# Patient Record
(demographics unavailable — no encounter records)

---

## 2024-10-15 NOTE — PHYSICAL EXAM
[Well Nourished] : well nourished [Interactive] : interactive [Obese] : obese [Well formed] : well formed [Normally Set] : normally set [Normal S1 and S2] : normal S1 and S2 [Clear to Ausculation Bilaterally] : clear to auscultation bilaterally [Normal] : normal  [4] : was Vince stage 4 [Normal Appearance] : normal in appearance [Vince Stage ___] : the Vince stage for breast development was [unfilled] [Left Paraspinal Hump] : left paraspinal hump was appreciated [Scoliosis] : scoliosis appreciated [Acanthosis Nigricans___] : no acanthosis nigricans [Murmur] : no murmurs [de-identified] : Sndromic facies

## 2024-10-15 NOTE — HISTORY OF PRESENT ILLNESS
[FreeTextEntry2] : Gia is an 15-mril-90-month-old female with diabetes diagnosed 5/24/24, not in DKA. She also has Prader-Willi syndrome had started growth hormone as of January 2024.   She is status post epiphysiodesis by ortho on 3/14/24 and was discharged on 3/15/24. She was seen in ED at [facility] on 5/23/24 for drainage noted at surgical incision. Labs glucose was 349 on CMP with a 283 fingerstick and so she was transferred to [facility] [place] for new onset diabetes.  She presented to ED with hyperglycemia based on A1c 13.4% and no ketosis or acidosis. Diabetes antibodies are negative except for slightly + JILLIAN 0.08. She was started on BBT with MDI, however, she is not currently on insulin. Blood sugars are in normal range monitored by fingerstick. Father had discontinued insulin on 6/3/24 and informed our team on 6/13/2 with blood sugar log for review. She was last seen last month and continued to demonstrate normal glucoses. There had been great modifications to Gia's diet, with elimination of sugary beverages and portion monitoring. She was continued on the same dosage of growth hormone with growth velocity of 9.91 cm/yr. She was restarted on metformin 500 mg ER. She had repeat labs done last month that revealed a persistently slightly positive JILLIAN with negative IA2 ab. Fasting insulin 13 uIU/mL and C-peptide 3.7 ng/mL.  Since last visit, Gia has been well. Gia took Metformin for 1 week, and was stopped because her BG was normal. For her diet, she has cut bread at home but in school she has bread. They also switched to brown rice. She drinks green juice and mostly drinks water. She goes to the park and walks for about 20 minutes and then she gets tired.   Before breakfast: 100-110 Highest glucose 126. Lowest glucose 96. (After meals) Father checks sugar 3 times a week.  Gia receives Omnitrope 2.6mg SQ 6 times a week (0.23 mg/kg/week); no missed dosages. No difficulties with the injection. [FreeTextEntry1] : Menarche earlier this year (2024), no monthly.

## 2024-10-15 NOTE — CONSULT LETTER
[Dear  ___] : Dear  [unfilled], [Courtesy Letter:] : I had the pleasure of seeing your patient, [unfilled], in my office today. [Please see my note below.] : Please see my note below. [Consult Closing:] : Thank you very much for allowing me to participate in the care of this patient.  If you have any questions, please do not hesitate to contact me. [Sincerely,] : Sincerely, [FreeTextEntry3] : Tawanda Dietz D.O.  for Pediatric Endocrinology Fellowship Residency Clerkship Director for Division  of Pediatric Endocrinology [facility]

## 2024-11-13 NOTE — HISTORY OF PRESENT ILLNESS
[Father] : father [Grade: ____] : Grade: [unfilled] [Eats meals with family] : eats meals with family [FreeTextEntry7] : 12 years old F here for APE. 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   [FreeTextEntry1] : changed from white rice to brown rice changed to wheat bread  ENDO --- GH, dad is giving Metformin 4500mg 3x/week ORTHO --- will start Brace for scoliosis CARDIO --- referral for f/u.  f/u Endo f/u Cardio f/u Genetics  -- maybe sometimes Ortho referral for limping Ophthal referral for failed vision.